# Patient Record
Sex: FEMALE | Race: OTHER | NOT HISPANIC OR LATINO | URBAN - METROPOLITAN AREA
[De-identification: names, ages, dates, MRNs, and addresses within clinical notes are randomized per-mention and may not be internally consistent; named-entity substitution may affect disease eponyms.]

---

## 2024-09-17 ENCOUNTER — OUTPATIENT (OUTPATIENT)
Dept: INPATIENT UNIT | Facility: HOSPITAL | Age: 34
LOS: 1 days | End: 2024-09-17
Payer: SELF-PAY

## 2024-09-17 VITALS
HEART RATE: 89 BPM | TEMPERATURE: 98 F | RESPIRATION RATE: 16 BRPM | SYSTOLIC BLOOD PRESSURE: 120 MMHG | DIASTOLIC BLOOD PRESSURE: 75 MMHG

## 2024-09-17 VITALS — HEART RATE: 89 BPM | SYSTOLIC BLOOD PRESSURE: 108 MMHG | DIASTOLIC BLOOD PRESSURE: 68 MMHG

## 2024-09-17 DIAGNOSIS — Z98.891 HISTORY OF UTERINE SCAR FROM PREVIOUS SURGERY: Chronic | ICD-10-CM

## 2024-09-17 DIAGNOSIS — O26.899 OTHER SPECIFIED PREGNANCY RELATED CONDITIONS, UNSPECIFIED TRIMESTER: ICD-10-CM

## 2024-09-17 LAB
APPEARANCE UR: CLEAR — SIGNIFICANT CHANGE UP
BACTERIA # UR AUTO: NEGATIVE /HPF — SIGNIFICANT CHANGE UP
BASOPHILS # BLD AUTO: 0.01 K/UL — SIGNIFICANT CHANGE UP (ref 0–0.2)
BASOPHILS NFR BLD AUTO: 0.2 % — SIGNIFICANT CHANGE UP (ref 0–2)
BILIRUB UR-MCNC: NEGATIVE — SIGNIFICANT CHANGE UP
BLD GP AB SCN SERPL QL: SIGNIFICANT CHANGE UP
CAST: 0 /LPF — SIGNIFICANT CHANGE UP (ref 0–4)
COLOR SPEC: YELLOW — SIGNIFICANT CHANGE UP
DIFF PNL FLD: NEGATIVE — SIGNIFICANT CHANGE UP
EOSINOPHIL # BLD AUTO: 0.05 K/UL — SIGNIFICANT CHANGE UP (ref 0–0.5)
EOSINOPHIL NFR BLD AUTO: 0.9 % — SIGNIFICANT CHANGE UP (ref 0–6)
GLUCOSE UR QL: NEGATIVE MG/DL — SIGNIFICANT CHANGE UP
HCT VFR BLD CALC: 33.6 % — LOW (ref 34.5–45)
HGB BLD-MCNC: 11.4 G/DL — LOW (ref 11.5–15.5)
HIV 1 & 2 AB SERPL IA.RAPID: SIGNIFICANT CHANGE UP
IMM GRANULOCYTES NFR BLD AUTO: 0.5 % — SIGNIFICANT CHANGE UP (ref 0–0.9)
KETONES UR-MCNC: NEGATIVE MG/DL — SIGNIFICANT CHANGE UP
LEUKOCYTE ESTERASE UR-ACNC: NEGATIVE — SIGNIFICANT CHANGE UP
LYMPHOCYTES # BLD AUTO: 1.01 K/UL — SIGNIFICANT CHANGE UP (ref 1–3.3)
LYMPHOCYTES # BLD AUTO: 17.3 % — SIGNIFICANT CHANGE UP (ref 13–44)
MCHC RBC-ENTMCNC: 28.9 PG — SIGNIFICANT CHANGE UP (ref 27–34)
MCHC RBC-ENTMCNC: 33.9 GM/DL — SIGNIFICANT CHANGE UP (ref 32–36)
MCV RBC AUTO: 85.1 FL — SIGNIFICANT CHANGE UP (ref 80–100)
MONOCYTES # BLD AUTO: 0.32 K/UL — SIGNIFICANT CHANGE UP (ref 0–0.9)
MONOCYTES NFR BLD AUTO: 5.5 % — SIGNIFICANT CHANGE UP (ref 2–14)
NEUTROPHILS # BLD AUTO: 4.43 K/UL — SIGNIFICANT CHANGE UP (ref 1.8–7.4)
NEUTROPHILS NFR BLD AUTO: 75.6 % — SIGNIFICANT CHANGE UP (ref 43–77)
NITRITE UR-MCNC: NEGATIVE — SIGNIFICANT CHANGE UP
PH UR: 6 — SIGNIFICANT CHANGE UP (ref 5–8)
PLATELET # BLD AUTO: 153 K/UL — SIGNIFICANT CHANGE UP (ref 150–400)
PROT UR-MCNC: SIGNIFICANT CHANGE UP MG/DL
RBC # BLD: 3.95 M/UL — SIGNIFICANT CHANGE UP (ref 3.8–5.2)
RBC # FLD: 15.1 % — HIGH (ref 10.3–14.5)
RBC CASTS # UR COMP ASSIST: 0 /HPF — SIGNIFICANT CHANGE UP (ref 0–4)
SP GR SPEC: 1.02 — SIGNIFICANT CHANGE UP (ref 1–1.03)
SQUAMOUS # UR AUTO: 1 /HPF — SIGNIFICANT CHANGE UP (ref 0–5)
UROBILINOGEN FLD QL: 1 MG/DL — SIGNIFICANT CHANGE UP (ref 0.2–1)
WBC # BLD: 5.85 K/UL — SIGNIFICANT CHANGE UP (ref 3.8–10.5)
WBC # FLD AUTO: 5.85 K/UL — SIGNIFICANT CHANGE UP (ref 3.8–10.5)
WBC UR QL: 2 /HPF — SIGNIFICANT CHANGE UP (ref 0–5)

## 2024-09-17 PROCEDURE — 59025 FETAL NON-STRESS TEST: CPT | Mod: 26

## 2024-09-17 PROCEDURE — 86703 HIV-1/HIV-2 1 RESULT ANTBDY: CPT

## 2024-09-17 PROCEDURE — 36415 COLL VENOUS BLD VENIPUNCTURE: CPT

## 2024-09-17 PROCEDURE — 81001 URINALYSIS AUTO W/SCOPE: CPT

## 2024-09-17 PROCEDURE — 87389 HIV-1 AG W/HIV-1&-2 AB AG IA: CPT

## 2024-09-17 PROCEDURE — 86762 RUBELLA ANTIBODY: CPT

## 2024-09-17 PROCEDURE — 86765 RUBEOLA ANTIBODY: CPT

## 2024-09-17 PROCEDURE — 87077 CULTURE AEROBIC IDENTIFY: CPT

## 2024-09-17 PROCEDURE — 85025 COMPLETE CBC W/AUTO DIFF WBC: CPT

## 2024-09-17 PROCEDURE — 59025 FETAL NON-STRESS TEST: CPT

## 2024-09-17 PROCEDURE — 86850 RBC ANTIBODY SCREEN: CPT

## 2024-09-17 PROCEDURE — 86780 TREPONEMA PALLIDUM: CPT

## 2024-09-17 PROCEDURE — 86803 HEPATITIS C AB TEST: CPT

## 2024-09-17 PROCEDURE — 87086 URINE CULTURE/COLONY COUNT: CPT

## 2024-09-17 PROCEDURE — 86900 BLOOD TYPING SEROLOGIC ABO: CPT

## 2024-09-17 PROCEDURE — 86706 HEP B SURFACE ANTIBODY: CPT

## 2024-09-17 PROCEDURE — 86901 BLOOD TYPING SEROLOGIC RH(D): CPT

## 2024-09-17 PROCEDURE — 87340 HEPATITIS B SURFACE AG IA: CPT

## 2024-09-17 PROCEDURE — 87070 CULTURE OTHR SPECIMN AEROBIC: CPT

## 2024-09-17 PROCEDURE — G0463: CPT

## 2024-09-17 PROCEDURE — 99203 OFFICE O/P NEW LOW 30 MIN: CPT | Mod: 25,GC

## 2024-09-17 NOTE — OB PROVIDER TRIAGE NOTE - NS_VISITREASON1_OBGYN_ALL_OB
Routing refill request to provider for review/approval because:  Drug not on the [facility] refill protocol   Gabapentin    Desi Davidson BSN, RN           Labor at Term

## 2024-09-17 NOTE — OB PROVIDER TRIAGE NOTE - NSOBPROVIDERNOTE_OBGYN_ALL_OB_FT
A/P: SANTOS PRAKASH is a 33yo  at 38w4d dated by LMP of 23 presenting for contractions. She has been feeling well except that she has experience some contractions over the past month (3 times a day). Today, she started to feel contraction early in the morning, which felt different from previous incidences, and decided to come to the ED for possible delivery.      Plan:   - Educate patient about not taking a cervical inducing agent  - Continue EFM  - Schedule for CS on Friday per MFM recommendation and Dr. Moreno   - Contact SW since [place] to the [place] and [place] not have health insurance - spoke with Melly Steinberg - pt's MRN to be shared / Medicaid Office to start process - (Carol) - [phone]  - Patient not in labor A/P: SANTOS PRAKASH is a 35yo  at 38w4d dated by LMP of 23 presenting for contractions. She has been feeling well except that she has experience some contractions over the past month (3 times a day). Today, she started to feel contractions early in the morning, which felt different from previous incidences, and decided to come to be evaluated.    Plan:   - Patient not in labor  - Educated patient about not taking a droteravine  - Reassuring fetal assessment with EFW 12%, BPP 8/, reactive and reassuring tracing  - Will schedule for CS on Friday at 39w0d per MFM recommendation and Dr. Moreno   - Instructed to arrive at 11:45 for 2:45pm CS, instructed to be NPO at midnight  - Prenatal labs collected  - Contacted SW since [place] and [place] not have health insurance - spoke with Melly Steinberg - pt's MRN to be shared / Medicaid Office to start process - (Carol) - [phone] A/P: SANTOS PRAKASH is a 33yo  at 38w4d dated by LMP of 23 presenting for contractions. She has been feeling well except that she has experience some contractions over the past month (3 times a day). Today, she started to feel contractions early in the morning, which felt different from previous incidences, and decided to come to be evaluated. Patient seen with language line [place]  Inez ID#[id number].    Plan:   - Patient not in labor  - Educated patient about not taking a droteravine  - Reassuring fetal assessment with EFW 12%, BPP , reactive and reassuring tracing  - Will schedule for CS on Friday at 39w0d per MFM recommendation and Dr. Moreno   - Instructed to arrive at 11:45 for 2:45pm CS, instructed to be NPO at midnight  - Prenatal labs collected  - Contacted SW since [place] the [place] and [place] not [place] health insurance - spoke with Melly Steinberg - pt's MRN to be shared / Medicaid Office to start process - (Carol) - [phone]

## 2024-09-17 NOTE — OB PROVIDER TRIAGE NOTE - HISTORY OF PRESENT ILLNESS
Statement Selected ANUSHKA PRAKASH is a 35yo  at 38 wks dated by LMD of 23 presenting for contractions. She has been feeling well except that she has experience some contractions over the past month (3 times a day). Today, she started to feel contraction early in the morning, which felt different from previous incidences, and decided to come to the ED for possible delivery.  She does not report leakage of fluids, vaginal bleeding, or painful contractions. She reports active fetal movement. She does not report fever, chills, nausea, vomiting, dysuria, or abnormal vaginal discharge. She does not report HA, RUQ pain, vision changes, or increased leg swelling.     PNC @ [facility]. Last ultrasound was conducted on  in [facility], with her fetus being measured to be normal by the physician. She remembers the weight was 1.8 kg.      OBhx:  3 x SAB in , ,   2 x  in  and  (weights unknown)    Gyn: denies cysts, fibrioids, or abnormal pap smear tests.  PMH: does not report any signifcant PMHx  PSH: does not report any significant PSHx except for the two C-sections.  Med: currently does not take any supplements or medications except for Sanofi No Spa 40mg PRN for contractions and maintenance (the last oral intake was one week ago)  Allergies: NKDA    Vitals:   T(C): 36.8 (24 @ 12:46), Max: 36.8 (24 @ 12:36)  T(F): 98.2 (24 @ 12:46), Max: 98.24 (24 @ 12:36)  HR: 89 (24 @ 12:46) (89 - 89)  BP: 120/75 (24 @ 12:46) (120/75 - 120/75)  RR: 16 (24 @ 12:46) (16 - 16)  SpO2: --    General: AAOx3, NAD  Abd: Soft, nontender, gravid  SVE: ***    BMI:       FHT: **  [place]:  **    MFM sono: **  Bedside sono: **    A/P:    SANTOS PRAKASH is a 35yo  at 38w4d dated by LMP of 23 presenting for contractions. She has been feeling well except that she has experience some contractions over the past month (3 times a day). Today, she started to feel contraction early in the morning, which felt different from previous incidences, and decided to come to the ED for possible delivery.      Sanofi No Spa 40mg PRN for contractions and maintenance (the last oral intake was one week ago). Medication seems to be used as a cervical agent in other countries. Pt taking She does not report leakage of fluids, vaginal bleeding, or painful contractions. She reports active fetal movement. She does not report fever, chills, nausea, vomiting, dysuria, or abnormal vaginal discharge. She does not report HA, RUQ pain, vision changes, or increased leg swelling.     PNC @ [facility]. Last ultrasound was conducted on  in [facility], with her fetus being measured to be normal by the physician. She remembers the weight was 1.8 kg.      OBhx:  3 x SAB in , ,   2 x  in  and  (weights unknown)    Gyn: denies cysts, fibrioids, or abnormal pap smear tests.  PMH: does not report any signifcant PMHx  PSH: does not report any significant PSHx except for the two C-sections.  Med: currently does not take any supplements or medications except for Sanofi No Spa 40mg PRN for contractions and maintenance (the last oral intake was one week ago)  Allergies: NKDA    Vitals:   T(C): 36.8 (24 @ 12:46), Max: 36.8 (24 @ 12:36)  T(F): 98.2 (24 @ 12:46), Max: 98.24 (24 @ 12:36)  HR: 89 (24 @ 12:46) (89 - 89)  BP: 120/75 (24 @ 12:46) (120/75 - 120/75)  RR: 16 (24 @ 12:46) (16 - 16)  SpO2: --    General: AAOx3, NAD  Abd: Soft, nontender, gravid  SVE: 0/0/-3    BMI:       FHT: baseline at   [place]:  none    Bedside sono: MATEO 10.1, vtx, BPP 8/8, EFW 2823grams, 12th percentile - completed w/ Dr Rainey     A/P: SANTOS PRAKASH is a 35yo  at 38w4d dated by LMP of 23 presenting for contractions. She has been feeling well except that she has experience some contractions over the past month (3 times a day). Today, she started to feel contraction early in the morning, which felt different from previous incidences, and decided to come to the ED for possible delivery.      Plan:   - Educate patient about not taking a cervical inducing agent  - Continue EFM  - Schedule for CS on Friday per MFM recommendation and Dr. Moreno   - Contact SW since pt new to the  and does not have health insurance  - Patient not in labor  SANTOS PRAKASH is a 33yo  at 38w4d dated by LMP of 23 presenting for contractions. She has been feeling well except that she has experience some contractions over the past month (3 times a day). Today, she started to feel contraction early in the morning, which felt different from previous incidences, and decided to come to the ED for possible delivery.      Sanofi No Spa 40mg PRN for contractions and maintenance (the last oral intake was one week ago). Medication seems to be used as a cervical agent in other countries. Pt taking She does not report leakage of fluids, vaginal bleeding, or painful contractions. She reports active fetal movement. She does not report fever, chills, nausea, vomiting, dysuria, or abnormal vaginal discharge. She does not report HA, RUQ pain, vision changes, or increased leg swelling.     PNC @ [facility]. Last ultrasound was conducted on  in [facility], with her fetus being measured to be normal by the physician. She remembers the weight was 1.8 kg.      OBhx:  3 x SAB in , ,   2 x  in  and  (weights unknown)    Gyn: denies cysts, fibrioids, or abnormal pap smear tests.  PMH: does not report any signifcant PMHx  PSH: does not report any significant PSHx except for the two C-sections.  Med: currently does not take any supplements or medications except for Sanofi No Spa 40mg PRN for contractions and maintenance (the last oral intake was one week ago)  Allergies: NKDA    Vitals:   T(C): 36.8 (24 @ 12:46), Max: 36.8 (24 @ 12:36)  T(F): 98.2 (24 @ 12:46), Max: 98.24 (24 @ 12:36)  HR: 89 (24 @ 12:46) (89 - 89)  BP: 120/75 (24 @ 12:46) (120/75 - 120/75)  RR: 16 (24 @ 12:46) (16 - 16)  SpO2: --    General: AAOx3, NAD  Abd: Soft, nontender, gravid  SVE: 0/0/-3    BMI:       FHT: reactive  [place]:  none    Bedside sono: MATEO 10.1, vtx, BPP 8/8, EFW 2823grams, 12th percentile - completed w/ Dr Rainey      SANTOS PRAKASH is a 35yo  at 38w4d dated by LMP of 23 presenting for contractions. She has been feeling well except that she has experienced some contractions over the past month (3 times a day). Today, she started to feel contractions early in the morning, which felt different from previous incidences, and decided to come to the ED for possible delivery.      She had been taking a medicatin called Drotaverine hydrochloride 40mg PRN for contractions and maintenance (the last use was one week ago). Medication is used ad an anti-spasmotic but also as a cervical ripening agent in other countries. She does not report leakage of fluids, vaginal bleeding, or painful contractions. She reports active fetal movement. She denies fever, chills, nausea, vomiting, dysuria, or abnormal vaginal discharge. She does not report HA, RUQ pain, vision changes, or increased leg swelling.     She moved here from [facility] 2 weeks ago. She reports she had an early ultrasound at 5 weeks and was told the pregnancy was measuring consistent with her LMP. Last ultrasound was conducted on  in [facility], with her fetus being measured to be normal by the physician. She remembers the weight was 1.8 kg.      OBhx:  3 x SAB in , ,   2 x  in  and  (weights unknown, no complications)    Gyn: denies cysts, fibroids, or abnormal pap smear tests.  PMH: does not report any significant PMHx  PSH: does not report any significant PSHx except for the two C-sections.  Med: currently does not take any supplements or medications except for Drotaverine hydrochloride 40mg PRNAllergies: NKDA    Vitals:   T(C): 36.8 (24 @ 12:46), Max: 36.8 (24 @ 12:36)  T(F): 98.2 (24 @ 12:46), Max: 98.24 (24 @ 12:36)  HR: 89 (24 @ 12:46) (89 - 89)  BP: 120/75 (24 @ 12:46) (120/75 - 120/75)  RR: 16 (24 @ 12:46) (16 - 16)  SpO2: --    General: AAOx3, NAD  Abd: Soft, nontender, gravid  SVE: 0/0/-3        FHT: reactive  [place]:  none    Bedside sono: MATEO 10.1, vtx, BPP 8/8, EFW 2823grams, 12th percentile - completed w/ Dr Rainey      SANTOS PRAKASH is a 35yo  at 38w4d dated by LMP of 23 and giving EDC 24 presenting for contractions. She has been feeling well except that she has experienced some contractions over the past month (3 times a day). Today, she started to feel contractions early in the morning, which felt different from previous incidences, and decided to come to the ED for possible delivery.      She had been taking a medication called Drotaverine hydrochloride 40mg PRN for contractions and maintenance (the last use was one week ago). Medication is used ad an anti-spasmotic but also as a cervical ripening agent in other countries. She does not report leakage of fluids, vaginal bleeding, or painful contractions. She reports active fetal movement. She denies fever, chills, nausea, vomiting, dysuria, or abnormal vaginal discharge. She does not report HA, RUQ pain, vision changes, or increased leg swelling.     She moved here from [facility] 2 weeks ago. She reports she had an early ultrasound at 5 weeks and was told the pregnancy was measuring consistent with her LMP. Last ultrasound was conducted on  in [facility], with her fetus being measured to be normal by the physician. She remembers the weight was 1.8 kg.      OBhx:  3 x SAB in , ,   2 x  in  and  (weights unknown, no complications)    Gyn: denies cysts, fibroids, or abnormal pap smear tests.  PMH: does not report any significant PMHx  PSH: does not report any significant PSHx except for the two C-sections.  Med: currently does not take any supplements or medications except for Drotaverine hydrochloride 40mg PRNAllergies: NKDA    Vitals:   T(C): 36.8 (24 @ 12:46), Max: 36.8 (24 @ 12:36)  T(F): 98.2 (24 @ 12:46), Max: 98.24 (24 @ 12:36)  HR: 89 (24 @ 12:46) (89 - 89)  BP: 120/75 (24 @ 12:46) (120/75 - 120/75)  RR: 16 (09-17-24 @ 12:46) (16 - 16)  SpO2: --    General: AAOx3, NAD  Abd: Soft, nontender, gravid  SVE: 0/0/-3        FHT: reactive  [place]:  none    Bedside sono: MATEO 10.1, vtx, BPP 8/8, EFW 2823grams, 12th percentile - completed w/ Dr Rainey

## 2024-09-17 NOTE — OB PROVIDER TRIAGE NOTE - ATTENDING COMMENTS
35yo  at 38w4d dated by LMP of 23 and giving EDC 24 presenting for contractions. She has been feeling well except that she has experienced some contractions over the past month (3 times a day). Today, she started to feel contractions early in the morning, which felt different from previous incidences, and decided to come to the be evaluated. Pt Arrived from [facility] 2 weeks ago but does report prenatal care while there with utlrasound at 5 weeks and dating c/w LMP. Pt with previous c/s x2.  T(F): 98.2 (24 @ 12:46), Max: 98.24 (24 @ 12:36)  HR: 89 (24 @ 12:46) (89 - 89)  BP: 120/75 (24 @ 12:46) (120/75 - 120/75)  FHt - reactive  toco - no contractions  SVE - closed  Bedside sono: MATEO 10.1, vtx, BPP 8/, EFW 2823grams, 12th percentile - completed w/ Dr Rainey   35 y/o  @ 38+4 with previous c/s x2 - late transfer of care   - no evidence of labor at this time   - ultrasound for dating performed by MFM fellow and Attending  - prenatal labs sent   - given late transfer and history of previous c/s will plan to schedule for R c/s at 39 weeks 24  - social work contacted regarding emergency medicaid and will contact patient or see her when she comes for delivery  - plan d/c home and return instructions given    Sharee Moreno MD

## 2024-09-18 LAB
HBV SURFACE AB SER-ACNC: REACTIVE
HBV SURFACE AG SERPL QL IA: SIGNIFICANT CHANGE UP
HCV AB S/CO SERPL IA: 0.05 S/CO — SIGNIFICANT CHANGE UP (ref 0–0.99)
HCV AB SERPL-IMP: SIGNIFICANT CHANGE UP
HIV 1+2 AB+HIV1 P24 AG SERPL QL IA: SIGNIFICANT CHANGE UP
MEV IGG SER-ACNC: 93.6 AU/ML — SIGNIFICANT CHANGE UP
MEV IGG+IGM SER-IMP: POSITIVE — SIGNIFICANT CHANGE UP
RUBV IGG SER-ACNC: 2.97 INDEX — SIGNIFICANT CHANGE UP
RUBV IGG SER-IMP: POSITIVE — SIGNIFICANT CHANGE UP
T PALLIDUM AB TITR SER: NEGATIVE — SIGNIFICANT CHANGE UP

## 2024-09-19 DIAGNOSIS — O47.1 FALSE LABOR AT OR AFTER 37 COMPLETED WEEKS OF GESTATION: ICD-10-CM

## 2024-09-19 DIAGNOSIS — O34.219 MATERNAL CARE FOR UNSPECIFIED TYPE SCAR FROM PREVIOUS CESAREAN DELIVERY: ICD-10-CM

## 2024-09-19 DIAGNOSIS — Z3A.38 38 WEEKS GESTATION OF PREGNANCY: ICD-10-CM

## 2024-09-19 DIAGNOSIS — O09.293 SUPERVISION OF PREGNANCY WITH OTHER POOR REPRODUCTIVE OR OBSTETRIC HISTORY, THIRD TRIMESTER: ICD-10-CM

## 2024-09-19 LAB
CULTURE RESULTS: SIGNIFICANT CHANGE UP
SPECIMEN SOURCE: SIGNIFICANT CHANGE UP

## 2024-09-19 RX ORDER — SODIUM CITRATE AND CITRIC ACID MONOHYDRATE 334; 500 MG/5ML; MG/5ML
30 SOLUTION ORAL ONCE
Refills: 0 | Status: COMPLETED | OUTPATIENT
Start: 2024-09-20 | End: 2024-09-20

## 2024-09-19 RX ORDER — ACETAMINOPHEN 325 MG
975 TABLET ORAL ONCE
Refills: 0 | Status: COMPLETED | OUTPATIENT
Start: 2024-09-20 | End: 2024-09-20

## 2024-09-19 RX ORDER — OXYTOCIN/RINGER'S LACTATE 20/500ML
167 PLASTIC BAG, INJECTION (ML) INTRAVENOUS
Qty: 30 | Refills: 0 | Status: DISCONTINUED | OUTPATIENT
Start: 2024-09-20 | End: 2024-09-22

## 2024-09-19 RX ORDER — SODIUM CHLORIDE IRRIG SOLUTION 0.9 %
1000 SOLUTION, IRRIGATION IRRIGATION
Refills: 0 | Status: DISCONTINUED | OUTPATIENT
Start: 2024-09-20 | End: 2024-09-22

## 2024-09-19 RX ORDER — SCOPOLAMINE 1 MG/3D
1 PATCH, EXTENDED RELEASE TRANSDERMAL ONCE
Refills: 0 | Status: COMPLETED | OUTPATIENT
Start: 2024-09-20 | End: 2024-09-20

## 2024-09-19 RX ORDER — CEFAZOLIN SODIUM 1 G
2000 VIAL (EA) INJECTION ONCE
Refills: 0 | Status: COMPLETED | OUTPATIENT
Start: 2024-09-20 | End: 2024-09-20

## 2024-09-19 RX ORDER — SODIUM CHLORIDE IRRIG SOLUTION 0.9 %
1000 SOLUTION, IRRIGATION IRRIGATION
Refills: 0 | Status: DISCONTINUED | OUTPATIENT
Start: 2024-09-20 | End: 2024-09-20

## 2024-09-19 RX ORDER — FAMOTIDINE 40 MG
20 TABLET ORAL ONCE
Refills: 0 | Status: COMPLETED | OUTPATIENT
Start: 2024-09-20 | End: 2024-09-20

## 2024-09-19 RX ORDER — CHLORHEXIDINE GLUCONATE ORAL RINSE 1.2 MG/ML
1 SOLUTION DENTAL DAILY
Refills: 0 | Status: DISCONTINUED | OUTPATIENT
Start: 2024-09-20 | End: 2024-09-20

## 2024-09-20 ENCOUNTER — INPATIENT (INPATIENT)
Facility: HOSPITAL | Age: 34
LOS: 1 days | Discharge: HOME | DRG: 998 | End: 2024-09-22
Attending: OBSTETRICS & GYNECOLOGY | Admitting: OBSTETRICS & GYNECOLOGY
Payer: MEDICAID

## 2024-09-20 VITALS
SYSTOLIC BLOOD PRESSURE: 120 MMHG | DIASTOLIC BLOOD PRESSURE: 66 MMHG | TEMPERATURE: 99 F | HEIGHT: 63 IN | WEIGHT: 149.03 LBS | RESPIRATION RATE: 19 BRPM | HEART RATE: 85 BPM

## 2024-09-20 DIAGNOSIS — Z98.891 HISTORY OF UTERINE SCAR FROM PREVIOUS SURGERY: Chronic | ICD-10-CM

## 2024-09-20 LAB
BASOPHILS # BLD AUTO: 0.01 K/UL — SIGNIFICANT CHANGE UP (ref 0–0.2)
BASOPHILS NFR BLD AUTO: 0.2 % — SIGNIFICANT CHANGE UP (ref 0–2)
BLD GP AB SCN SERPL QL: SIGNIFICANT CHANGE UP
EOSINOPHIL # BLD AUTO: 0.06 K/UL — SIGNIFICANT CHANGE UP (ref 0–0.5)
EOSINOPHIL NFR BLD AUTO: 1.1 % — SIGNIFICANT CHANGE UP (ref 0–6)
HCT VFR BLD CALC: 33.2 % — LOW (ref 34.5–45)
HGB BLD-MCNC: 11.2 G/DL — LOW (ref 11.5–15.5)
IMM GRANULOCYTES NFR BLD AUTO: 0.6 % — SIGNIFICANT CHANGE UP (ref 0–0.9)
LYMPHOCYTES # BLD AUTO: 0.97 K/UL — LOW (ref 1–3.3)
LYMPHOCYTES # BLD AUTO: 18.5 % — SIGNIFICANT CHANGE UP (ref 13–44)
MCHC RBC-ENTMCNC: 28.5 PG — SIGNIFICANT CHANGE UP (ref 27–34)
MCHC RBC-ENTMCNC: 33.7 GM/DL — SIGNIFICANT CHANGE UP (ref 32–36)
MCV RBC AUTO: 84.5 FL — SIGNIFICANT CHANGE UP (ref 80–100)
MONOCYTES # BLD AUTO: 0.28 K/UL — SIGNIFICANT CHANGE UP (ref 0–0.9)
MONOCYTES NFR BLD AUTO: 5.3 % — SIGNIFICANT CHANGE UP (ref 2–14)
NEUTROPHILS # BLD AUTO: 3.89 K/UL — SIGNIFICANT CHANGE UP (ref 1.8–7.4)
NEUTROPHILS NFR BLD AUTO: 74.3 % — SIGNIFICANT CHANGE UP (ref 43–77)
PLATELET # BLD AUTO: 160 K/UL — SIGNIFICANT CHANGE UP (ref 150–400)
RBC # BLD: 3.93 M/UL — SIGNIFICANT CHANGE UP (ref 3.8–5.2)
RBC # FLD: 15.1 % — HIGH (ref 10.3–14.5)
WBC # BLD: 5.24 K/UL — SIGNIFICANT CHANGE UP (ref 3.8–10.5)
WBC # FLD AUTO: 5.24 K/UL — SIGNIFICANT CHANGE UP (ref 3.8–10.5)

## 2024-09-20 PROCEDURE — 93010 ELECTROCARDIOGRAM REPORT: CPT

## 2024-09-20 PROCEDURE — 59514 CESAREAN DELIVERY ONLY: CPT | Mod: U9,GC

## 2024-09-20 RX ORDER — ONDANSETRON HCL/PF 4 MG/2 ML
4 VIAL (ML) INJECTION EVERY 6 HOURS
Refills: 0 | Status: DISCONTINUED | OUTPATIENT
Start: 2024-09-20 | End: 2024-09-22

## 2024-09-20 RX ORDER — NALOXONE HYDROCHLORIDE 0.4 MG/ML
0.1 INJECTION, SOLUTION INTRAMUSCULAR; INTRAVENOUS; SUBCUTANEOUS
Refills: 0 | Status: DISCONTINUED | OUTPATIENT
Start: 2024-09-20 | End: 2024-09-22

## 2024-09-20 RX ORDER — DIPHENHYDRAMINE HCL 12.5MG/5ML
25 LIQUID (ML) ORAL EVERY 4 HOURS
Refills: 0 | Status: DISCONTINUED | OUTPATIENT
Start: 2024-09-20 | End: 2024-09-22

## 2024-09-20 RX ORDER — KETOROLAC TROMETHAMINE 10 MG/1
30 TABLET, FILM COATED ORAL ONCE
Refills: 0 | Status: DISCONTINUED | OUTPATIENT
Start: 2024-09-20 | End: 2024-09-20

## 2024-09-20 RX ORDER — ENOXAPARIN SODIUM 150 MG/ML
40 INJECTION SUBCUTANEOUS EVERY 24 HOURS
Refills: 0 | Status: DISCONTINUED | OUTPATIENT
Start: 2024-09-21 | End: 2024-09-22

## 2024-09-20 RX ORDER — OXYCODONE HYDROCHLORIDE 30 MG/1
5 TABLET, FILM COATED, EXTENDED RELEASE ORAL
Refills: 0 | Status: DISCONTINUED | OUTPATIENT
Start: 2024-09-20 | End: 2024-09-20

## 2024-09-20 RX ORDER — NALBUPHINE HYDROCHLORIDE 10 MG/ML
2.5 INJECTION, SOLUTION INTRAMUSCULAR; INTRAVENOUS; SUBCUTANEOUS EVERY 6 HOURS
Refills: 0 | Status: DISCONTINUED | OUTPATIENT
Start: 2024-09-20 | End: 2024-09-22

## 2024-09-20 RX ADMIN — CHLORHEXIDINE GLUCONATE ORAL RINSE 1 APPLICATION(S): 1.2 SOLUTION DENTAL at 15:28

## 2024-09-20 RX ADMIN — Medication 200 MILLILITER(S): at 15:00

## 2024-09-20 RX ADMIN — KETOROLAC TROMETHAMINE 30 MILLIGRAM(S): 10 TABLET, FILM COATED ORAL at 16:30

## 2024-09-20 RX ADMIN — Medication 20 MILLIGRAM(S): at 15:14

## 2024-09-20 RX ADMIN — Medication 975 MILLIGRAM(S): at 15:11

## 2024-09-20 RX ADMIN — SODIUM CITRATE AND CITRIC ACID MONOHYDRATE 30 MILLILITER(S): 334; 500 SOLUTION ORAL at 15:11

## 2024-09-20 RX ADMIN — Medication 975 MILLIGRAM(S): at 15:00

## 2024-09-20 RX ADMIN — SCOPOLAMINE 1 PATCH: 1 PATCH, EXTENDED RELEASE TRANSDERMAL at 15:13

## 2024-09-20 RX ADMIN — Medication 220 MILLIGRAM(S): at 15:54

## 2024-09-20 RX ADMIN — Medication 2000 MILLIGRAM(S): at 15:45

## 2024-09-20 RX ADMIN — Medication 167 MILLIUNIT(S)/MIN: at 16:29

## 2024-09-20 NOTE — DISCHARGE NOTE OB - CARE PROVIDER_API CALL
Courtney Stern  Obstetrics and Gynecology  [facility] [place] [address]  Phone: [phone]  Fax: [phone]  Follow Up Time:

## 2024-09-20 NOTE — DISCHARGE NOTE OB - PROVIDER RX CONTACT NUMBER
[phone] Propranolol Pregnancy And Lactation Text: This medication is Pregnancy Category C and it isn't known if it is safe during pregnancy. It is excreted in breast milk.

## 2024-09-20 NOTE — OB RN DELIVERY SUMMARY - NSSELHIDDEN_OBGYN_ALL_OB_FT
[NS_DeliveryAttending1_OBGYN_ALL_OB_FT:[id number]=],[NS_DeliveryRN_OBGYN_ALL_OB_FT:[id number]]

## 2024-09-20 NOTE — OB PROVIDER H&P - ATTENDING COMMENTS
33yo  @ 39 weeks by LMP  (EDC per pt 24) who presents to L&D for rCS. She originally lives in [facility] but came to the [facility] to visit her  who lives in [place]. She was seen by the team at triage three days ago for her increasing uterine contractions (3x a day) and scheduled for rCS today. Shd had ultrasound at that time that showed her 12th% but c/w dates by LMP. She does not report any leakage of fluids, vaginal bleeding, or painful contractions. Reports active fetal movement.  HR: 85 (24 @ 14:14) (85 - 85)  BP: 120/66 (24 @ 14:14) (120/66 - 120/66)  FHT - reactive  toco - irregular  35 y/o  @ 39  weeks for R c/s   - admit to L&D  - consent obtained  - plan for R c/s    Sharee Moreno MD 35yo  @ 39 weeks by LMP  (EDC per pt 24) who presents to L&D for rCS. She originally lives in [facility] but came to the [facility] to visit her  who lives in [place]. She was seen by the team at triage three days ago for her increasing uterine contractions (3x a day) and scheduled for rCS today. Shd had ultrasound at that time that showed her 12th% but c/w dates by LMP. She does not report any leakage of fluids, vaginal bleeding, or painful contractions. Reports active fetal movement.  HR: 85 (24 @ 14:14) (85 - 85)  BP: 120/66 (24 @ 14:14) (120/66 - 120/66)  FHT - reactive  toco - irregular  sono - ceph  35 y/o  @ 39  weeks for R c/s   - admit to L&D  - consent obtained  - plan for R c/s    Sharee Moreno MD

## 2024-09-20 NOTE — OB PROVIDER DELIVERY SUMMARY - NSSELHIDDEN_OBGYN_ALL_OB_FT
[NS_DeliveryAttending1_OBGYN_ALL_OB_FT:[email or web address]=],[NS_DeliveryRN_OBGYN_ALL_OB_FT:[id number]=],[NS_DeliveryAttending2_OBGYN_ALL_OB_FT:[id number]=]

## 2024-09-20 NOTE — OB RN PATIENT PROFILE - FALL HARM RISK - UNIVERSAL INTERVENTIONS
Bed in lowest position, wheels locked, appropriate side rails in place/Call bell, personal items and telephone in reach/Instruct patient to call for assistance before getting out of bed or chair/Non-slip footwear when patient is out of bed/[place] to call system/Physically safe environment - no spills, clutter or unnecessary equipment/Purposeful Proactive Rounding/Room/bathroom lighting operational, light cord in reach

## 2024-09-20 NOTE — DISCHARGE NOTE OB - CARE PLAN
Assessment and plan of treatment:	Patient underwent a  delivery. Post-op course was uncomplicated. Pain is well controlled with PRN medication. She has no difficulty with ambulation, voiding, or PO intake. Lab values and vital signs are within normal limits prior to discharge.

## 2024-09-20 NOTE — OB PROVIDER H&P - HISTORY OF PRESENT ILLNESS
SALVADORROGELIO... DWAIN is a 35yo  by LMP who presents to L&D for rCS. She originally lives in [facility] but came to the [facility] to visit her  who lives in [place]. She was seen by the team at triage three days ago for her increasing uterine contractions (3x a day) and scheduled for rCS today. She does not report any leakage of fluids, vaginal bleeding, or painful contractions. Reports active fetal movement. She denies fever, chills, nausea, vomiting, dysuria, or abnormal vaginal discharge. Denies HA, RUQ pain, vision changes, increased leg swelling.     PNC @ [facility] and [place]    OBhx:  G1 - SAB   G2 - pCS , uncomp  G3 - rCS , uncomp  G4 - SAB   G5 - SAB   G6 - current pregnancy    Gyn: denies fibroids, cysts, or abnormal pap smears  PMH: denies  PSH: denies  Med: PNV  Allergies: NKDA    Vitals:   T(C): --  T(F): --  HR: 85 (24 @ 14:14) (85 - 85)  BP: 120/66 (24 @ 14:14) (120/66 - 120/66)  RR: --  SpO2: --    General: AAOx3, NAD  Abd: Soft, nontender, gravid  SVE: cephalic, vertex    BMI: TBD    MFM sono: 2823 g (12%ile) on   Bedside sono: TBD    A/P:   For OR for CS    D/w Dr. Moreno   SANTOS... DWAIN is a 33yo  by LMP who presents to L&D for rCS. She originally lives in [facility] but came to the [facility] to visit her  who lives in [place]. She was seen by the team at triage three days ago for her increasing uterine contractions (3x a day) and scheduled for rCS today. She does not report any leakage of fluids, vaginal bleeding, or painful contractions. Reports active fetal movement. She denies fever, chills, nausea, vomiting, dysuria, or abnormal vaginal discharge. Denies HA, RUQ pain, vision changes, increased leg swelling.     [facility] @ [facility]    OBhx:  G1 - SAB   G2 - pCS , uncomp  G3 - rCS , uncomp  G4 - SAB   G5 - SAB   G6 - current pregnancy    Gyn: denies fibroids, cysts, or abnormal pap smears  PMH: denies  PSH: denies  Med: PNV  Allergies: NKDA    Vitals:   T(C): --  T(F): --  HR: 85 (24 @ 14:14) (85 - 85)  BP: 120/66 (24 @ 14:14) (120/66 - 120/66)  RR: --  SpO2: --    General: AAOx3, NAD  Abd: Soft, nontender, gravid  SVE: cephalic, vertex    BMI: TBD    MFM sono: 2823 g (12%ile) on   Bedside sono: TBD    A/P:   For OR for CS    D/w Dr. Moreno

## 2024-09-20 NOTE — OB PROVIDER H&P - INTERNATIONAL TRAVEL COUNTRIES
Spoke with Dr. Adamson.  Recommended and changed phenytoin suspension to Fosphenytoin IVPB since suspension is on backorder and not available. [place]

## 2024-09-20 NOTE — OB RN PATIENT PROFILE - FUNCTIONAL ASSESSMENT - BASIC MOBILITY 3.
Dear Courtney Alaniz,    We are sorry you are not feeling well. Based on the responses you provided, it is recommended that you be seen in-person in urgent care so we can better evaluate your symptoms. Please click here to find the nearest urgent care location to you.   You will not be charged for this Visit. Thank you for trusting us with your care.    Denise Patel MD     4 = No assist / stand by assistance

## 2024-09-20 NOTE — OB RN INTRAOPERATIVE NOTE - NSSELHIDDEN_OBGYN_ALL_OB_FT
[NS_DeliveryAttending1_OBGYN_ALL_OB_FT:[id number]=],[NS_DeliveryRN_OBGYN_ALL_OB_FT:[id number]=]

## 2024-09-20 NOTE — DISCHARGE NOTE OB - NPI NUMBER (FOR SYSADMIN USE ONLY) :
You can access the FollowMyHealth Patient Portal offered by [facility] by registering at the following website: [email or web address]. By joining [employer]’s FollowMyHealth portal, you will also be able to view your health information using other applications (apps) compatible with our system.
[[phone]]

## 2024-09-20 NOTE — OB RN DELIVERY SUMMARY - NS_SEPSISRSKCALC_OBGYN_ALL_OB_FT
No temperature has been documented for this patient in CPN or on the OB Flowsheet. Ensure the highest temperature during labor was documented on the OB Flowsheet.  No gestational age at birth has been documented. Ensure delivery date/time has been entered above.  Rupture of membranes must be entered above.  'Type of intrapartum antibiotics' must be entered above.   EOS calculated successfully. EOS Risk Factor: 0.5/1000 live births ([facility] national incidence); GA=39w;Temp=98.8; ROM=0.017; GBS='Unknown'; Antibiotics='No antibiotics or any antibiotics < 2 hrs prior to birth'

## 2024-09-20 NOTE — OB PROVIDER DELIVERY SUMMARY - NSPROVIDERDELIVERYNOTE_OBGYN_ALL_OB_FT
Pt taken to the OR for scheduled repeat C/S.  Not in labor.  Spinal anesthesia.  Low transverse  section was performed.  Delivered live female infant, vtx, moderate amount of clear amniotic fluid.  One loose nuchal cord.  Uterus, tubes, ovaries WNL.   Hysterotomy was reapproximated with 0-vicryl and 0-monocryl suture, excellent hemostasis obtained.  Fascia were reapproximated with 0-vicryl suture, excellent hemostasis obtained. Sucutaneous tissue closed with 2-0 plain gut suture.  Skin closed with 3-0 monocryl suture.    [place]:   Weight: 2980g  Sex Assigned at Birth: female  APGARS:  9/9    QBL: 404ml  Uop: 150ml  IVF: 700ml  Specimens: none  Intraoperative complications: none

## 2024-09-21 LAB
CULTURE RESULTS: SIGNIFICANT CHANGE UP
HCT VFR BLD CALC: 30.5 % — LOW (ref 34.5–45)
HGB BLD-MCNC: 10.3 G/DL — LOW (ref 11.5–15.5)
MCHC RBC-ENTMCNC: 29.2 PG — SIGNIFICANT CHANGE UP (ref 27–34)
MCHC RBC-ENTMCNC: 33.8 GM/DL — SIGNIFICANT CHANGE UP (ref 32–36)
MCV RBC AUTO: 86.4 FL — SIGNIFICANT CHANGE UP (ref 80–100)
PLATELET # BLD AUTO: 157 K/UL — SIGNIFICANT CHANGE UP (ref 150–400)
RBC # BLD: 3.53 M/UL — LOW (ref 3.8–5.2)
RBC # FLD: 15.2 % — HIGH (ref 10.3–14.5)
SPECIMEN SOURCE: SIGNIFICANT CHANGE UP
T PALLIDUM AB TITR SER: NEGATIVE — SIGNIFICANT CHANGE UP
WBC # BLD: 7.43 K/UL — SIGNIFICANT CHANGE UP (ref 3.8–10.5)
WBC # FLD AUTO: 7.43 K/UL — SIGNIFICANT CHANGE UP (ref 3.8–10.5)

## 2024-09-21 RX ORDER — MAGNESIUM HYDROXIDE 400 MG/5ML
30 SUSPENSION, ORAL (FINAL DOSE FORM) ORAL
Refills: 0 | Status: DISCONTINUED | OUTPATIENT
Start: 2024-09-21 | End: 2024-09-22

## 2024-09-21 RX ORDER — OXYCODONE HYDROCHLORIDE 30 MG/1
5 TABLET, FILM COATED, EXTENDED RELEASE ORAL
Refills: 0 | Status: DISCONTINUED | OUTPATIENT
Start: 2024-09-21 | End: 2024-09-22

## 2024-09-21 RX ORDER — OXYTOCIN/RINGER'S LACTATE 20/500ML
167 PLASTIC BAG, INJECTION (ML) INTRAVENOUS
Qty: 30 | Refills: 0 | Status: DISCONTINUED | OUTPATIENT
Start: 2024-09-21 | End: 2024-09-22

## 2024-09-21 RX ORDER — ACETAMINOPHEN 325 MG
975 TABLET ORAL
Refills: 0 | Status: DISCONTINUED | OUTPATIENT
Start: 2024-09-21 | End: 2024-09-22

## 2024-09-21 RX ORDER — KETOROLAC TROMETHAMINE 10 MG/1
30 TABLET, FILM COATED ORAL EVERY 6 HOURS
Refills: 0 | Status: COMPLETED | OUTPATIENT
Start: 2024-09-21 | End: 2024-09-22

## 2024-09-21 RX ORDER — SODIUM CHLORIDE IRRIG SOLUTION 0.9 %
1000 SOLUTION, IRRIGATION IRRIGATION
Refills: 0 | Status: DISCONTINUED | OUTPATIENT
Start: 2024-09-21 | End: 2024-09-22

## 2024-09-21 RX ORDER — DIPHENHYDRAMINE HCL 12.5MG/5ML
25 LIQUID (ML) ORAL EVERY 6 HOURS
Refills: 0 | Status: DISCONTINUED | OUTPATIENT
Start: 2024-09-21 | End: 2024-09-22

## 2024-09-21 RX ORDER — OXYCODONE HYDROCHLORIDE 30 MG/1
5 TABLET, FILM COATED, EXTENDED RELEASE ORAL ONCE
Refills: 0 | Status: DISCONTINUED | OUTPATIENT
Start: 2024-09-21 | End: 2024-09-22

## 2024-09-21 RX ORDER — TETANUS TOXOID, REDUCED DIPHTHERIA TOXOID AND ACELLULAR PERTUSSIS VACCINE, ADSORBED 5; 2.5; 8; 8; 2.5 [IU]/.5ML; [IU]/.5ML; UG/.5ML; UG/.5ML; UG/.5ML
0.5 SUSPENSION INTRAMUSCULAR ONCE
Refills: 0 | Status: DISCONTINUED | OUTPATIENT
Start: 2024-09-21 | End: 2024-09-22

## 2024-09-21 RX ADMIN — Medication 600 MILLIGRAM(S): at 23:45

## 2024-09-21 RX ADMIN — Medication 600 MILLIGRAM(S): at 17:35

## 2024-09-21 RX ADMIN — KETOROLAC TROMETHAMINE 30 MILLIGRAM(S): 10 TABLET, FILM COATED ORAL at 05:07

## 2024-09-21 RX ADMIN — Medication 975 MILLIGRAM(S): at 15:11

## 2024-09-21 RX ADMIN — KETOROLAC TROMETHAMINE 30 MILLIGRAM(S): 10 TABLET, FILM COATED ORAL at 11:36

## 2024-09-21 RX ADMIN — Medication 975 MILLIGRAM(S): at 02:01

## 2024-09-21 RX ADMIN — Medication 975 MILLIGRAM(S): at 08:52

## 2024-09-21 RX ADMIN — Medication 975 MILLIGRAM(S): at 21:43

## 2024-09-21 RX ADMIN — Medication 80 MILLIGRAM(S): at 22:25

## 2024-09-21 RX ADMIN — ENOXAPARIN SODIUM 40 MILLIGRAM(S): 150 INJECTION SUBCUTANEOUS at 05:07

## 2024-09-21 NOTE — PROGRESS NOTE ADULT - ATTENDING COMMENTS
34y   s/p rCS @39w, scheduled.  - currently with minimal bleeding/lochia only, no symptoms of anemia, post partum Hgb 10.3  - health female infant at bedside   - vital signs, lab results, and physical exam reassuring   - DVT PPX: ambulation/Lovenox  - anticipated discharge: continue inpatient care

## 2024-09-22 VITALS
TEMPERATURE: 98 F | DIASTOLIC BLOOD PRESSURE: 67 MMHG | RESPIRATION RATE: 18 BRPM | OXYGEN SATURATION: 97 % | HEART RATE: 73 BPM | SYSTOLIC BLOOD PRESSURE: 97 MMHG

## 2024-09-22 LAB
BASOPHILS # BLD AUTO: 0.02 K/UL — SIGNIFICANT CHANGE UP (ref 0–0.2)
BASOPHILS NFR BLD AUTO: 0.4 % — SIGNIFICANT CHANGE UP (ref 0–2)
EOSINOPHIL # BLD AUTO: 0.09 K/UL — SIGNIFICANT CHANGE UP (ref 0–0.5)
EOSINOPHIL NFR BLD AUTO: 1.7 % — SIGNIFICANT CHANGE UP (ref 0–6)
HCT VFR BLD CALC: 29.3 % — LOW (ref 34.5–45)
HGB BLD-MCNC: 9.7 G/DL — LOW (ref 11.5–15.5)
IMM GRANULOCYTES NFR BLD AUTO: 0.2 % — SIGNIFICANT CHANGE UP (ref 0–0.9)
LYMPHOCYTES # BLD AUTO: 1.16 K/UL — SIGNIFICANT CHANGE UP (ref 1–3.3)
LYMPHOCYTES # BLD AUTO: 21.7 % — SIGNIFICANT CHANGE UP (ref 13–44)
MCHC RBC-ENTMCNC: 29.1 PG — SIGNIFICANT CHANGE UP (ref 27–34)
MCHC RBC-ENTMCNC: 33.1 GM/DL — SIGNIFICANT CHANGE UP (ref 32–36)
MCV RBC AUTO: 88 FL — SIGNIFICANT CHANGE UP (ref 80–100)
MONOCYTES # BLD AUTO: 0.38 K/UL — SIGNIFICANT CHANGE UP (ref 0–0.9)
MONOCYTES NFR BLD AUTO: 7.1 % — SIGNIFICANT CHANGE UP (ref 2–14)
NEUTROPHILS # BLD AUTO: 3.69 K/UL — SIGNIFICANT CHANGE UP (ref 1.8–7.4)
NEUTROPHILS NFR BLD AUTO: 68.9 % — SIGNIFICANT CHANGE UP (ref 43–77)
PLATELET # BLD AUTO: 152 K/UL — SIGNIFICANT CHANGE UP (ref 150–400)
RBC # BLD: 3.33 M/UL — LOW (ref 3.8–5.2)
RBC # FLD: 15.2 % — HIGH (ref 10.3–14.5)
WBC # BLD: 5.35 K/UL — SIGNIFICANT CHANGE UP (ref 3.8–10.5)
WBC # FLD AUTO: 5.35 K/UL — SIGNIFICANT CHANGE UP (ref 3.8–10.5)

## 2024-09-22 PROCEDURE — 86900 BLOOD TYPING SEROLOGIC ABO: CPT

## 2024-09-22 PROCEDURE — 93005 ELECTROCARDIOGRAM TRACING: CPT

## 2024-09-22 PROCEDURE — 86780 TREPONEMA PALLIDUM: CPT

## 2024-09-22 PROCEDURE — 59050 FETAL MONITOR W/REPORT: CPT

## 2024-09-22 PROCEDURE — 59025 FETAL NON-STRESS TEST: CPT

## 2024-09-22 PROCEDURE — 86850 RBC ANTIBODY SCREEN: CPT

## 2024-09-22 PROCEDURE — 86901 BLOOD TYPING SEROLOGIC RH(D): CPT

## 2024-09-22 PROCEDURE — 85025 COMPLETE CBC W/AUTO DIFF WBC: CPT

## 2024-09-22 PROCEDURE — 36415 COLL VENOUS BLD VENIPUNCTURE: CPT

## 2024-09-22 PROCEDURE — 85027 COMPLETE CBC AUTOMATED: CPT

## 2024-09-22 RX ORDER — ACETAMINOPHEN 325 MG
2 TABLET ORAL
Qty: 56 | Refills: 0
Start: 2024-09-22 | End: 2024-09-28

## 2024-09-22 RX ADMIN — Medication 600 MILLIGRAM(S): at 11:12

## 2024-09-22 RX ADMIN — ENOXAPARIN SODIUM 40 MILLIGRAM(S): 150 INJECTION SUBCUTANEOUS at 06:06

## 2024-09-22 RX ADMIN — Medication 975 MILLIGRAM(S): at 08:17

## 2024-09-22 RX ADMIN — Medication 600 MILLIGRAM(S): at 06:06

## 2024-09-22 RX ADMIN — Medication 975 MILLIGRAM(S): at 03:12

## 2024-09-22 NOTE — PROGRESS NOTE ADULT - ASSESSMENT
A/P: EBTESAMABDOABDULL... DWAIN is a 34y   s/p rCS @39w, scheduled.    POD2    #Routine postpartum care  - Stable, doing well postpartum  - Hgb 11.2>10.3  - Pain: well controlled, c/w current regimen  - GI: c/w regular diet, normal bowel function  - :  lochia decreasing, voiding spontaneously  - DVT ppx: SCDs, ambulation encouraged, lovenox  - Healthy baby F  - follow up social work consult, particularly regarding health insurance  - Dispo: anticipate discharge home today if meeting pp milestones and pending attending approval    Signed: Emi Diaz MD (PGY1)  
  A/P: EBTESAMABDOABDULL... DWAIN is a 34y   s/p rCS @39w, scheduled.    POD1    #Routine postpartum care  - Stable, doing well postpartum  - Hgb 11.2>10.3  - Pain: well controlled, c/w current regimen  - GI: c/w regular diet, normal bowel function  - :  lochia decreasing, due for TOV@0700 this AM  - DVT ppx: SCDs, ambulation encouraged, lovenox  - Healthy baby F  - Dispo: anticipate discharge home tomorrow if meeting pp milestones and pending attending approval    Signed: Emi Diaz MD (PGY1)

## 2024-09-22 NOTE — PROGRESS NOTE ADULT - SUBJECTIVE AND OBJECTIVE BOX
SALVADORAMABDOABDULL... DWAIN is a 34y   s/p rCS @39w, scheduled.    POD2      No acute events overnight.  Patient has no complaints.  Pain is well controlled.  +flatus, + voiding, -BM.  Ambulating and tolerating PO.  Appropriate lochia.  Denies fever, chills, nausea, and vomiting.  She denies lightheadedness, dizziness, HA, blurry vision, palpitations, chest pain and SOB.     Physical exam:  General: AOx3, NAD.  Abdomen: Soft, appropriately tender to palpitation, fundus firm.  Incision: c/d/i  : voiding spontaneously  Vaginal: expectant lochia  Ext: No DVT signs, warm extremities.    Vital Signs Last 24 Hrs  T(C): 36.6 (21 Sep 2024 16:22), Max: 37 (21 Sep 2024 07:19)  T(F): 97.8 (21 Sep 2024 16:22), Max: 98.6 (21 Sep 2024 07:19)  HR: 76 (21 Sep 2024 16:22) (60 - 76)  BP: 93/59 (21 Sep 2024 16:22) (93/59 - 101/54)  BP(mean): --  RR: 18 (21 Sep 2024 16:22) (18 - 18)  SpO2: 97% (21 Sep 2024 16:22) (97% - 98%)    Parameters below as of 21 Sep 2024 16:22  Patient On (Oxygen Delivery Method): room air        LABS:                        10.3   7.43  )-----------( 157      ( 21 Sep 2024 05:51 )             30.5           
SALVADORYANETOABDULL... DWAIN is a 34y   s/p rCS @39w, scheduled.    POD1     ID#[id number]    No acute events overnight.  Patient has no complaints.  Pain is well controlled.  -flatus, - voiding, -BM.  Ambulating and tolerating PO.  Appropriate lochia.  Denies fever, chills, nausea, and vomiting.  She denies lightheadedness, dizziness, HA, blurry vision, palpitations, chest pain and SOB.     Physical exam:  General: AOx3, NAD.  Abdomen: Soft, appropriately tender to palpitation, fundus firm.  Incision: c/d/i  : caballero removed, awaiting TOV  Vaginal: expectant lochia  Ext: No DVT signs, warm extremities.    Vital Signs Last 24 Hrs  T(C): 36.9 (21 Sep 2024 04:00), Max: 37.1 (20 Sep 2024 14:07)  T(F): 98.5 (21 Sep 2024 04:00), Max: 98.8 (20 Sep 2024 14:07)  HR: 59 (21 Sep 2024 04:00) (59 - 85)  BP: 110/65 (21 Sep 2024 04:00) (100/56 - 120/66)  BP(mean): --  RR: 18 (21 Sep 2024 04:00) (13 - 20)  SpO2: 97% (21 Sep 2024 04:00) (97% - 100%)    Parameters below as of 21 Sep 2024 04:00  Patient On (Oxygen Delivery Method): room air        LABS:                        10.3   7.43  )-----------( 157      ( 21 Sep 2024 05:51 )             30.5           
INTERVAL HPI/OVERNIGHT EVENTS:  34y Female s/p c section under spinal anesthesia with duramorph for post op analgesia on 9/20/24    Vital Signs Last 24 Hrs  T(C): 37 (21 Sep 2024 07:19), Max: 37.1 (20 Sep 2024 14:07)  T(F): 98.6 (21 Sep 2024 07:19), Max: 98.8 (20 Sep 2024 14:07)  HR: 61 (21 Sep 2024 07:19) (59 - 85)  BP: 101/54 (21 Sep 2024 07:19) (100/56 - 120/66)  BP(mean): --  RR: 18 (21 Sep 2024 07:19) (13 - 20)  SpO2: 97% (21 Sep 2024 07:19) (97% - 100%)    Parameters below as of 21 Sep 2024 07:19  Patient On (Oxygen Delivery Method): room air            Patient's overall anesthesia satisfaction: Positive    Patients pain is well controlled with IT duramorph    No respiratory events overnight    No pruritis at this time    Patient doing well     No headache      No residual numbness or weakness, sensory and motor function intact.    No anesthetic complications or complaints noted or reported          .

## 2025-02-17 NOTE — OB PROVIDER H&P - NSPREVIOUSLIVEBIR_OBGYN_ALL_OB
You were evaluated in the emergency department for: chest pain. You can access your current and pending results through [facility] [facility]. A radiologist will take a second look at your X-Rays, if you had any, and you will be contacted with any new findings.     You should follow-up with your primary care provider, as soon as possible, for re-evaluation.  If you do not have a primary care provider, I have referred you to [facility]. You will be contacted about scheduling an appointment. Their phone number is also included on this paperwork.  You have also been referred to cardiology and you should follow-up with them as well.     Your workup revealed no emergent features at this time; however, many disease processes are dynamic:    Please, return to the emergency department if you experience new or worsening symptoms, fever, chest pain, shortness of breath, difficulty breathing, dizziness, abdominal pain, persistent nausea/vomiting, syncope or passing out, blood in your urine or stool, coughing up blood, leg swelling/pain, urinary retention, bowel or bladder incontinence, numbness between your legs.    Additionally, your blood pressure was measured to be high. This is something that you should discuss with your primary care provider and have re-checked within one week.     Yes